# Patient Record
Sex: FEMALE | Race: WHITE | NOT HISPANIC OR LATINO | Employment: UNEMPLOYED | URBAN - METROPOLITAN AREA
[De-identification: names, ages, dates, MRNs, and addresses within clinical notes are randomized per-mention and may not be internally consistent; named-entity substitution may affect disease eponyms.]

---

## 2020-09-02 ENCOUNTER — TELEPHONE (OUTPATIENT)
Dept: OBSTETRICS AND GYNECOLOGY | Facility: CLINIC | Age: 20
End: 2020-09-02

## 2020-09-08 ENCOUNTER — TELEPHONE (OUTPATIENT)
Dept: OBSTETRICS AND GYNECOLOGY | Facility: CLINIC | Age: 20
End: 2020-09-08

## 2021-03-02 ENCOUNTER — OFFICE VISIT (OUTPATIENT)
Dept: ENDOCRINOLOGY | Facility: CLINIC | Age: 21
End: 2021-03-02

## 2021-03-02 ENCOUNTER — LAB (OUTPATIENT)
Dept: LAB | Facility: HOSPITAL | Age: 21
End: 2021-03-02

## 2021-03-02 VITALS
SYSTOLIC BLOOD PRESSURE: 124 MMHG | DIASTOLIC BLOOD PRESSURE: 60 MMHG | BODY MASS INDEX: 34.2 KG/M2 | HEIGHT: 63 IN | WEIGHT: 193 LBS | TEMPERATURE: 97.5 F | OXYGEN SATURATION: 98 %

## 2021-03-02 DIAGNOSIS — E22.1 HYPERPROLACTINEMIA (HCC): ICD-10-CM

## 2021-03-02 DIAGNOSIS — E22.1 HYPERPROLACTINEMIA (HCC): Primary | ICD-10-CM

## 2021-03-02 PROCEDURE — 99204 OFFICE O/P NEW MOD 45 MIN: CPT | Performed by: INTERNAL MEDICINE

## 2021-03-02 PROCEDURE — 84146 ASSAY OF PROLACTIN: CPT

## 2021-03-02 NOTE — ASSESSMENT & PLAN NOTE
A new problem  Prolactin of 26 is only mildly high but there is no obvious reason such and meds or hypothyroidism that would cause this.  She has no clinical features of pcos . So if high prolactin is confirmed then I will plan on mri of pituitary and treat with cabergoline .

## 2021-03-02 NOTE — PROGRESS NOTES
"     Office Note      Date: 2021  Patient Name: Claudia Olmos  MRN: [id number]  : 2000    Cc: high prolactin    History of Present Illness:   Claudia Olmos is a 20 y.o. female who presents for high prolactin.  She is . Menarche age 10 and has never been regular. She has not had lupron for 2 years but only had 4 cycles last year.  She does not have a breast discharge  Labs showed high prolactin- 26 with normal being below 23.  tfts lh, fsh were normal  She has no breast pain and was on no meds at the time     Subjective        Review of Systems:   Review of Systems   Constitutional: Negative.    HENT: Negative.    Eyes: Negative.    Cardiovascular: Negative.    Gastrointestinal: Positive for abdominal pain and constipation.   Endocrine: Negative.    Genitourinary: Positive for menstrual problem.   Musculoskeletal: Negative.    Skin: Negative.    Allergic/Immunologic: Negative.    Neurological: Negative.    Hematological: Negative.    Psychiatric/Behavioral: Negative.        The following portions of the patient's history were reviewed and updated as appropriate: allergies, current medications, past family history, past medical history, past social history, past surgical history and problem list.    Objective     Visit Vitals  /60 (BP Location: Left arm, Patient Position: Sitting, Cuff Size: Adult)   Temp 97.5 °F (36.4 °C) (Infrared)   Ht 160 cm (63\")   Wt 87.5 kg (193 lb)   SpO2 98%   BMI 34.19 kg/m²       Labs:    CBC w/DIFF  No results found for: WBC, RBC, HGB, HCT, MCV, MCH, MCHC, RDW, RDWSD, MPV, PLT, NEUTRORELPCT, LYMPHORELPCT, MONORELPCT, EOSRELPCT, BASORELPCT, AUTOIGPER, NEUTROABS, LYMPHSABS, MONOSABS, EOSABS, BASOSABS, AUTOIGNUM, NRBC    T4  No results found for: FREET4    TSH  No results found for: TSHBASE     Physical Exam:  Physical Exam  Vitals signs reviewed.   Constitutional:       General: She is not in acute distress.     Appearance: She is obese. She is not ill-appearing, " toxic-appearing or diaphoretic.   HENT:      Head: Normocephalic and atraumatic.   Eyes:      Extraocular Movements: Extraocular movements intact.      Pupils: Pupils are equal, round, and reactive to light.   Neck:      Comments: No goiter  Cardiovascular:      Rate and Rhythm: Normal rate and regular rhythm.      Pulses: Normal pulses.      Heart sounds: No murmur.   Pulmonary:      Effort: Pulmonary effort is normal. No respiratory distress.   Musculoskeletal:         General: No swelling.   Lymphadenopathy:      Cervical: No cervical adenopathy.   Skin:     General: Skin is warm and dry.   Neurological:      Mental Status: She is alert and oriented to person, place, and time.   Psychiatric:         Judgment: Judgment normal.         Assessment / Plan      Assessment & Plan:  Problem List Items Addressed This Visit        Other    Hyperprolactinemia (CMS/HCC) - Primary    Current Assessment & Plan      A new problem  Prolactin of 26 is only mildly high but there is no obvious reason such and meds or hypothyroidism that would cause this.  She has no clinical features of pcos . So if high prolactin is confirmed then I will plan on mri of pituitary and treat with cabergoline .           Relevant Orders    Prolactin           Taz Rodriguez MD   03/02/2021

## 2021-03-03 LAB — PROLACTIN SERPL-MCNC: 9.76 NG/ML (ref 4.79–23.3)

## 2021-03-04 ENCOUNTER — TELEPHONE (OUTPATIENT)
Dept: ENDOCRINOLOGY | Facility: CLINIC | Age: 21
End: 2021-03-04

## 2021-03-04 NOTE — TELEPHONE ENCOUNTER
----- Message from Taz Rodriguez MD sent at 3/4/2021  8:40 AM EST -----  Please let her know that her repeat prolactin was normal. Unfortunately, because of that, I have no real answers as to why her cycles are irregular

## 2023-06-09 ENCOUNTER — TRANSCRIBE ORDERS (OUTPATIENT)
Dept: OBSTETRICS AND GYNECOLOGY | Facility: HOSPITAL | Age: 23
End: 2023-06-09
Payer: COMMERCIAL

## 2023-06-09 DIAGNOSIS — E22.1 HYPERPROLACTINEMIA: ICD-10-CM

## 2023-06-09 DIAGNOSIS — O36.1190 ISOIMMUNIZATION FROM BLOOD GROUP INCOMPATIBILITY DURING PREGNANCY, ANTEPARTUM, SINGLE OR UNSPECIFIED FETUS: Primary | ICD-10-CM

## 2023-06-09 DIAGNOSIS — Z34.90 PREGNANCY, UNSPECIFIED GESTATIONAL AGE: ICD-10-CM

## 2023-08-09 ENCOUNTER — HOSPITAL ENCOUNTER (OUTPATIENT)
Dept: WOMENS IMAGING | Facility: HOSPITAL | Age: 23
Discharge: HOME | End: 2023-08-09
Admitting: NURSE PRACTITIONER
Payer: COMMERCIAL

## 2023-08-09 ENCOUNTER — OFFICE VISIT (OUTPATIENT)
Dept: OBSTETRICS AND GYNECOLOGY | Facility: HOSPITAL | Age: 23
End: 2023-08-09
Payer: COMMERCIAL

## 2023-08-09 VITALS — DIASTOLIC BLOOD PRESSURE: 51 MMHG | WEIGHT: 175.2 LBS | SYSTOLIC BLOOD PRESSURE: 107 MMHG | BODY MASS INDEX: 31.04 KG/M2

## 2023-08-09 DIAGNOSIS — Z34.90 PREGNANCY, UNSPECIFIED GESTATIONAL AGE: ICD-10-CM

## 2023-08-09 DIAGNOSIS — O34.219 PREVIOUS CESAREAN DELIVERY AFFECTING PREGNANCY: ICD-10-CM

## 2023-08-09 DIAGNOSIS — E22.1 HYPERPROLACTINEMIA: ICD-10-CM

## 2023-08-09 DIAGNOSIS — O36.1190 ISOIMMUNIZATION FROM BLOOD GROUP INCOMPATIBILITY DURING PREGNANCY, ANTEPARTUM, SINGLE OR UNSPECIFIED FETUS: Primary | ICD-10-CM

## 2023-08-09 DIAGNOSIS — O36.1190 ISOIMMUNIZATION FROM BLOOD GROUP INCOMPATIBILITY DURING PREGNANCY, ANTEPARTUM, SINGLE OR UNSPECIFIED FETUS: ICD-10-CM

## 2023-08-09 PROCEDURE — 76815 OB US LIMITED FETUS(S): CPT

## 2023-08-09 RX ORDER — PRENATAL VIT/IRON FUM/FOLIC AC 27MG-0.8MG
TABLET ORAL DAILY
COMMUNITY

## 2023-08-09 NOTE — ASSESSMENT & PLAN NOTE
Her fetus is being followed given the risk of development of anemia due to maternal isoimmunization.  The middle cerebral artery (MCA) blood velocity is used as an indicator of blood viscosity and Mary et al (Ultrasound Obstet Gynecol 1995;5:400) have developed curves for the estimation of fetal anemia.  These curves are gestational age-dependent and allow assessment from 18-35 weeks' gestation.  The MCA velocity technique allows us to monitor the progress of a pregnancy complicated by alloimmunization without resorting to amniocentesis as frequently as prior to the development of this method.  They are used as an adjunct to regular ultrasound assessment, antibody titer measurement, NST/SORAYA (if appropriate) and amniocentesis.  Based on these curves, an MCA systolic peak velocity of > 1.5 MoM for gestational age carries the highest risk for significant fetal anemia.  If the peak MCA systolic velocity is above 1.5 MoM for gestational age, intervention is generally suggested (amniocentesis or fetal blood sampling) to more directly assess fetal anemia.      On ultrasound today the fetus appears entirely normal.  No abnormalities were seen and no evidence of hydrops, however it was too early to perform a complete anatomic survey.  We will rescan the patient again in 4 weeks to complete an anatomic survey.    Isoimmunization with antibody titers less than 8 are generally not associated with in utero fetal compromise.  As such we would recommend monthly antibody titers on this patient and as long as the titer stays less than 8 there is not a need for her to return to the  diagnostic center.  If her titer increases to 8 or greater then we would recommend referring her back to the  diagnostic center for further evaluation.

## 2023-08-09 NOTE — LETTER
August 9, 2023     SARIKA Warren  [address] [place] [id number]    Patient: Claudia Olmos   YOB: 2000   Date of Visit: 8/9/2023       Dear SARIKA Warren:    Thank you for referring Claudia Olmos to me for evaluation. Below are the relevant portions of my assessment and plan of care.    Encounter Diagnosis and Orders:  Diagnoses and all orders for this visit:    1. 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  The middle cerebral artery (MCA) blood velocity is used as an indicator of blood viscosity and Mary et al (Ultrasound Obstet Gynecol 1995;5:400) have developed curves for the estimation of fetal anemia.  These curves are gestational age-dependent and allow assessment from 18-35 weeks' gestation.  The MCA velocity technique allows us to monitor the progress of a pregnancy complicated by alloimmunization without resorting to amniocentesis as frequently as prior to the development of this method.  They are used as an adjunct to regular ultrasound assessment, antibody titer measurement, NST/SORAYA (if appropriate) and amniocentesis.  Based on these curves, an MCA systolic peak velocity of > 1.5 MoM for gestational age carries the highest risk for significant fetal anemia.  If the peak MCA systolic velocity is above 1.5 MoM for gestational age, intervention is generally suggested (amniocentesis or fetal blood sampling) to more directly assess fetal anemia.      On ultrasound today the fetus appears entirely normal.  No abnormalities were seen and no evidence of hydrops, however it was too early to perform a complete anatomic survey.  We will rescan the patient again in 4 weeks to complete an anatomic survey.    Isoimmunization with antibody titers less than 8 are generally not associated with in utero  fetal compromise.  As such we would recommend monthly antibody titers on this patient and as long as the titer stays less than 8 there is not a need for her to return to the  diagnostic center.  If her titer increases to 8 or greater then we would recommend referring her back to the  diagnostic center for further evaluation.      2. Previous  delivery affecting pregnancy    3. Pregnancy, unspecified gestational age        If you have questions, please do not hesitate to call me. I look forward to following Claudia along with you.         Sincerely,        Douglas A. Milligan, MD        CC: No Recipients

## 2023-08-09 NOTE — PROGRESS NOTES
"Documentation of the ultrasound findings, images, and interpretations will be available in the patient's Viewpoint report which is located in the imaging tab in chart review.    Maternal/Fetal Medicine Consult Note     Name: Claudia Olmos    : 2000     MRN: [id number]     Referring Provider: SARIKA Warren    Chief Complaint  No chief complaint on file.    Subjective     History of Present Illness:  Claudia Olmos is a 22 y.o.  16w0d who presents today for isoimmunization    VANDANA: Estimated Date of Delivery: 24     ROS:   As noted in HPI.     Past Medical History:   Diagnosis Date    History of transfusion 2018    had one during c section      Past Surgical History:   Procedure Laterality Date     SECTION  2018    TONSILLECTOMY AND ADENOIDECTOMY        OB History          2    Para   1    Term   1       0    AB   0    Living   1         SAB   0    IAB   0    Ectopic   0    Molar   0    Multiple   0    Live Births   1          Obstetric Comments   Fob #1 - Pregnancy #1  Fob #2 - Pregnancy #2                Objective     Vital Signs  /51   Wt 79.5 kg (175 lb 3.2 oz)   Estimated body mass index is 31.04 kg/mý as calculated from the following:    Height as of 3/2/21: 160 cm (63\").    Weight as of this encounter: 79.5 kg (175 lb 3.2 oz).    Physical Exam    Ultrasound Impression:   See viewpoint    Assessment and Plan     Claudia Olmos is a 22 y.o.  16w0d who presents today for isoimmunization    Diagnoses and all orders for this visit:    1. 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  The middle cerebral artery (MCA) blood velocity is used as an indicator of blood viscosity and Mary et al (Ultrasound Obstet Gynecol 1995;5:400) have developed curves for the estimation of " fetal anemia.  These curves are gestational age-dependent and allow assessment from 18-35 weeks' gestation.  The MCA velocity technique allows us to monitor the progress of a pregnancy complicated by alloimmunization without resorting to amniocentesis as frequently as prior to the development of this method.  They are used as an adjunct to regular ultrasound assessment, antibody titer measurement, NST/SORAYA (if appropriate) and amniocentesis.  Based on these curves, an MCA systolic peak velocity of > 1.5 MoM for gestational age carries the highest risk for significant fetal anemia.  If the peak MCA systolic velocity is above 1.5 MoM for gestational age, intervention is generally suggested (amniocentesis or fetal blood sampling) to more directly assess fetal anemia.      On ultrasound today the fetus appears entirely normal.  No abnormalities were seen and no evidence of hydrops, however it was too early to perform a complete anatomic survey.  We will rescan the patient again in 4 weeks to complete an anatomic survey.    Isoimmunization with antibody titers less than 8 are generally not associated with in utero fetal compromise.  As such we would recommend monthly antibody titers on this patient and as long as the titer stays less than 8 there is not a need for her to return to the  diagnostic center.  If her titer increases to 8 or greater then we would recommend referring her back to the  diagnostic center for further evaluation.      2. Previous  delivery affecting pregnancy    3. Pregnancy, unspecified gestational age         Follow Up  Return in about 4 weeks (around 2023).    I spent 30 minutes caring for the patient on the day of service. This included: obtaining or reviewing a separately obtained medical history, reviewing patient records, performing a medically appropriate exam and/or evaluation, counseling or educating the patient/family/caregiver, ordering medications, labs,  and/or procedures and documenting such in the medical record. This does not include time spent on review and interpretation of other tests such as fetal ultrasound or the performance of other procedures such as amniocentesis or CVS.      Douglas A. Milligan, MD  Maternal Fetal Medicine, [facility]     2023

## 2023-09-07 ENCOUNTER — OFFICE VISIT (OUTPATIENT)
Dept: OBSTETRICS AND GYNECOLOGY | Facility: HOSPITAL | Age: 23
End: 2023-09-07
Payer: COMMERCIAL

## 2023-09-07 ENCOUNTER — HOSPITAL ENCOUNTER (OUTPATIENT)
Dept: WOMENS IMAGING | Facility: HOSPITAL | Age: 23
Discharge: HOME | End: 2023-09-07
Admitting: OBSTETRICS & GYNECOLOGY
Payer: COMMERCIAL

## 2023-09-07 VITALS — SYSTOLIC BLOOD PRESSURE: 111 MMHG | WEIGHT: 178 LBS | BODY MASS INDEX: 31.53 KG/M2 | DIASTOLIC BLOOD PRESSURE: 66 MMHG

## 2023-09-07 DIAGNOSIS — O36.1190 ISOIMMUNIZATION FROM BLOOD GROUP INCOMPATIBILITY DURING PREGNANCY, ANTEPARTUM, SINGLE OR UNSPECIFIED FETUS: Primary | ICD-10-CM

## 2023-09-07 DIAGNOSIS — O34.219 PREVIOUS CESAREAN DELIVERY AFFECTING PREGNANCY: ICD-10-CM

## 2023-09-07 DIAGNOSIS — O36.1190 ISOIMMUNIZATION FROM BLOOD GROUP INCOMPATIBILITY DURING PREGNANCY, ANTEPARTUM, SINGLE OR UNSPECIFIED FETUS: ICD-10-CM

## 2023-09-07 DIAGNOSIS — Z34.90 PREGNANCY, UNSPECIFIED GESTATIONAL AGE: ICD-10-CM

## 2023-09-07 PROCEDURE — 76811 OB US DETAILED SNGL FETUS: CPT

## 2023-09-07 NOTE — LETTER
"2023       No Recipients    Patient: Claudia Olmos   YOB: 2000   Date of Visit: 2023       Dear SARIKA Warren,    Thank you for referring Claudia Olmos to me for evaluation. Below is a copy of my consult note.    If you have questions, please do not hesitate to call me. I look forward to following Claudia along with you.         Sincerely,        Kimberley Zapata MD        CC:   No Recipients    Patient denies bleeding, leaking fluid or contractions  NIPT negative  Patients next follow up with ISAI BAUM is 2023        Maternal/Fetal Medicine Follow Up Note     Name: Claudia Olmos    : 2000     MRN: [id number]     Referring Provider: SARIKA Warren    Chief Complaint  Anti C positive    Subjective     History of Present Illness:  Claudia Olmos is a 22 y.o.  20w1d who presents today for follow up in the setting of maternal isoimmunization   No interval issues   Last titer was 1:1 (Anti-c, 23)     VANDANA: Estimated Date of Delivery: 24     ROS:   As noted in HPI.     Objective     Vital Signs  /66   Wt 80.7 kg (178 lb)   Estimated body mass index is 31.53 kg/m² as calculated from the following:    Height as of 3/2/21: 160 cm (63\").    Weight as of this encounter: 80.7 kg (178 lb).    Ultrasound Impression:   See viewpoint    Assessment and Plan     Claudia Olmos is a 22 y.o.  20w1d who presents today for ***    Diagnoses and all orders for this visit:    1. 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 Recommend continued monthly titers in your office. If titer increases to 1:16, please re-refer [facility] for serial MCA dopplers. Otherwise follow up with MFM as needed.              Follow Up  PRN     I spent 20 minutes caring for the patient on the day of service. This included: obtaining or reviewing a " separately obtained medical history, reviewing patient records, performing a medically appropriate exam and/or evaluation, counseling or educating the patient/family/caregiver, ordering medications, labs, and/or procedures and documenting such in the medical record. This does not include time spent on review and interpretation of other tests such as fetal ultrasound or the performance of other procedures such as amniocentesis or CVS.    Kimberley Zapata MD FACOG  Maternal Fetal Medicine, [facility]     2023

## 2023-09-07 NOTE — PROGRESS NOTES
Patient denies bleeding, leaking fluid or contractions  NIPT negative  Patients next follow up with ISAI BAUM is 9/11/2023

## 2023-09-07 NOTE — ASSESSMENT & PLAN NOTE
Last antibody titer was 1:1. Recommend continued monthly titers in your office. If titer increases to 1:16, please [facility] serial MCA dopplers. Otherwise follow up with MFM as needed.

## 2023-09-07 NOTE — PROGRESS NOTES
"    Maternal/Fetal Medicine Follow Up Note     Name: Claudia Olmos    : 2000     MRN: [id number]     Referring Provider: SARIKA Warren    Chief Complaint  Anti C positive    Subjective     History of Present Illness:  Claudia Olmos is a 22 y.o.  20w1d who presents today for follow up in the setting of maternal isoimmunization   No interval issues   Last titer was 1:1 (Anti-c, 23)     VANDANA: Estimated Date of Delivery: 24     ROS:   As noted in HPI.     Objective     Vital Signs  /66   Wt 80.7 kg (178 lb)   Estimated body mass index is 31.53 kg/m² as calculated from the following:    Height as of 3/2/21: 160 cm (63\").    Weight as of this encounter: 80.7 kg (178 lb).    Ultrasound Impression:   See viewpoint    Assessment and Plan     Claudia Olmos is a 22 y.o.  20w1d who presents today for follow up in the setting of isoimmunization with low titer     Diagnoses and all orders for this visit:    1. 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 Recommend continued monthly titers in your office. If titer increases to 1:16, please re-refer patient to [facility] for serial MCA dopplers. Otherwise follow up with MFM as needed.              Follow Up  PRN     I spent 20 minutes caring for the patient on the day of service. This included: obtaining or reviewing a separately obtained medical history, reviewing patient records, performing a medically appropriate exam and/or evaluation, counseling or educating the patient/family/caregiver, ordering medications, labs, and/or procedures and documenting such in the medical record. This does not include time spent on review and interpretation of other tests such as fetal ultrasound or the performance of other procedures such as amniocentesis or CVS.    Kimberley Zapata MD FACOG  Maternal Fetal Medicine, [facility] " [place]    [facility]     2023

## 2023-09-07 NOTE — LETTER
"2023       No Recipients    Patient: Claudia Olmos   YOB: 2000   Date of Visit: 2023       Dear SARIKA Warren,    Thank you for referring Claudia Olmos to me for evaluation. Below is a copy of my consult note.    If you have questions, please do not hesitate to call me. I look forward to following Claudia along with you.         Sincerely,        Kimberley Zapata MD        CC:   No Recipients    Patient denies bleeding, leaking fluid or contractions  NIPT negative  Patients next follow up with ISAI BAUM is 2023        Maternal/Fetal Medicine Follow Up Note     Name: Claudia Olmos    : 2000     MRN: [id number]     Referring Provider: SARIKA Warren    Chief Complaint  Anti C positive    Subjective     History of Present Illness:  Claudia Olmos is a 22 y.o.  20w1d who presents today for follow up in the setting of maternal isoimmunization   No interval issues   Last titer was 1:1 (Anti-c, 23)     VANDANA: Estimated Date of Delivery: 24     ROS:   As noted in HPI.     Objective     Vital Signs  /66   Wt 80.7 kg (178 lb)   Estimated body mass index is 31.53 kg/m² as calculated from the following:    Height as of 3/2/21: 160 cm (63\").    Weight as of this encounter: 80.7 kg (178 lb).    Ultrasound Impression:   See viewpoint    Assessment and Plan     Claudia Olmos is a 22 y.o.  20w1d who presents today for follow up in the setting of isoimmunization with low titer     Diagnoses and all orders for this visit:    1. 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 Recommend continued monthly titers in your office. If titer increases to 1:16, please [facility] serial MCA dopplers. Otherwise follow up with MFM as needed.              Follow Up  PRN     I spent 20 minutes caring for the patient on the " day of service. This included: obtaining or reviewing a separately obtained medical history, reviewing patient records, performing a medically appropriate exam and/or evaluation, counseling or educating the patient/family/caregiver, ordering medications, labs, and/or procedures and documenting such in the medical record. This does not include time spent on review and interpretation of other tests such as fetal ultrasound or the performance of other procedures such as amniocentesis or CVS.    Kimberley Zapata MD FACOG  Maternal Fetal Medicine, [facility]     2023

## 2024-09-24 ENCOUNTER — TRANSCRIBE ORDERS (OUTPATIENT)
Dept: OBSTETRICS AND GYNECOLOGY | Facility: HOSPITAL | Age: 24
End: 2024-09-24
Payer: COMMERCIAL

## 2024-09-24 DIAGNOSIS — Z98.891 HISTORY OF 2 CESAREAN SECTIONS: ICD-10-CM

## 2024-09-24 DIAGNOSIS — O30.031 MONOCHORIONIC DIAMNIOTIC TWIN PREGNANCY IN FIRST TRIMESTER: ICD-10-CM

## 2024-09-24 DIAGNOSIS — Z34.90 PREGNANCY, UNSPECIFIED GESTATIONAL AGE: ICD-10-CM

## 2024-09-24 DIAGNOSIS — O36.1190 ISOIMMUNIZATION FROM BLOOD GROUP INCOMPATIBILITY DURING PREGNANCY, ANTEPARTUM, SINGLE OR UNSPECIFIED FETUS: Primary | ICD-10-CM

## 2024-10-22 ENCOUNTER — HOSPITAL ENCOUNTER (OUTPATIENT)
Dept: WOMENS IMAGING | Facility: HOSPITAL | Age: 24
Discharge: HOME | End: 2024-10-22
Payer: COMMERCIAL

## 2024-10-22 ENCOUNTER — OFFICE VISIT (OUTPATIENT)
Dept: OBSTETRICS AND GYNECOLOGY | Facility: HOSPITAL | Age: 24
End: 2024-10-22
Payer: COMMERCIAL

## 2024-10-22 VITALS
DIASTOLIC BLOOD PRESSURE: 79 MMHG | WEIGHT: 177 LBS | BODY MASS INDEX: 32.57 KG/M2 | SYSTOLIC BLOOD PRESSURE: 119 MMHG | HEIGHT: 62 IN

## 2024-10-22 DIAGNOSIS — O30.039 MONOCHORIONIC DIAMNIOTIC TWIN PREGNANCY, ANTEPARTUM: Primary | ICD-10-CM

## 2024-10-22 DIAGNOSIS — O36.1190 ISOIMMUNIZATION FROM BLOOD GROUP INCOMPATIBILITY DURING PREGNANCY, ANTEPARTUM, SINGLE OR UNSPECIFIED FETUS: ICD-10-CM

## 2024-10-22 DIAGNOSIS — Z34.90 PREGNANCY, UNSPECIFIED GESTATIONAL AGE: ICD-10-CM

## 2024-10-22 DIAGNOSIS — O30.031 MONOCHORIONIC DIAMNIOTIC TWIN PREGNANCY IN FIRST TRIMESTER: ICD-10-CM

## 2024-10-22 DIAGNOSIS — O34.219 PREVIOUS CESAREAN DELIVERY AFFECTING PREGNANCY: ICD-10-CM

## 2024-10-22 DIAGNOSIS — Z98.891 HISTORY OF 2 CESAREAN SECTIONS: ICD-10-CM

## 2024-10-22 PROCEDURE — 76813 OB US NUCHAL MEAS 1 GEST: CPT

## 2024-10-22 PROCEDURE — 76814 OB US NUCHAL MEAS ADD-ON: CPT

## 2024-10-22 PROCEDURE — 76801 OB US < 14 WKS SINGLE FETUS: CPT

## 2024-10-22 PROCEDURE — 76802 OB US < 14 WKS ADDL FETUS: CPT

## 2024-10-22 RX ORDER — PROMETHAZINE HYDROCHLORIDE 12.5 MG/1
12.5 TABLET ORAL EVERY 6 HOURS PRN
COMMUNITY
Start: 2024-09-18

## 2024-10-22 NOTE — ASSESSMENT & PLAN NOTE
Patient referred for isoimmunization in this pregnancy.  Patient's last pregnancy was complicated by anti-C antibodies.  These antibodies never silvina to a sufficient titer to require surveillance or intervention during her last pregnancy.  She has anti-C antibodies again but her titer is already 1-1 28..  This is high enough that it may resulting in fetal anemia.    Father this pregnancy was provided with a prescription for C antigen testing.  Patient will take this to the lab and if they will not perform it based on a prescription he will contact his primary care provider for testing.  We will assume unless we see evidence that he is to see antigen negative at that pregnancies are at risk and perform every 2-week middle cerebral artery Doppler peak velocities.  We begin these at 18 weeks gestation as it would be not possible to eat performing any in utero intervention prior to 18 weeks gestation.    We will scan the patient every 2 weeks after 18 weeks gestation to the performed MCA Doppler peak velocities.  If these remain reassuring the patient should deliver at 36 to 37 weeks gestation.

## 2024-10-22 NOTE — PROGRESS NOTES
"Documentation of the ultrasound findings, images, and interpretations will be available in the patient's Viewpoint report which is located in the imaging tab in chart review.    Maternal/Fetal Medicine Consult Note     Name: Claudia Olmos    : 2000     MRN: [id number]     Referring Provider: SARIKA Recinos    Chief Complaint  Mo/Di twins, + anti c antibodies, prev C/S x 2    Subjective     History of Present Illness:  Claudia Olmos is a 24 y.o.  12w2d who presents today for Edmundo twins, isoimmunization    VANDANA: Estimated Date of Delivery: 25     ROS:   As noted in HPI.     Past Medical History:   Diagnosis Date    Chlamydia     History of transfusion     had one during c section      Past Surgical History:   Procedure Laterality Date     SECTION       SECTION      TONSILLECTOMY AND ADENOIDECTOMY        OB History          3    Para   2    Term   2       0    AB   0    Living   2         SAB   0    IAB   0    Ectopic   0    Molar   0    Multiple   0    Live Births   2          Obstetric Comments   Fob #1 - Pregnancy #1  Fob #2 - Pregnancy #2   FOB #2 Pregnancy #3               Objective     Vital Signs  /79   Ht 157.5 cm (62\")   Wt 80.3 kg (177 lb)   LMP  (LMP Unknown)   Estimated body mass index is 32.37 kg/m² as calculated from the following:    Height as of this encounter: 157.5 cm (62\").    Weight as of this encounter: 80.3 kg (177 lb).    Physical Exam    Ultrasound Impression:   See Viewpoint    Assessment and Plan     Claudia Olmos is a 24 y.o.  12w2d who presents today for AMA, isoimmunization    Diagnoses and all orders for this visit:    1. Monochorionic diamniotic twin pregnancy, antepartum (Primary)  Assessment & Plan:  This twin gestation appears monochorionic/diamniotic with same sex fetuses and a thin dividing membrane with absence of a twin-peak sign. The gestational age of the pregnancy by ultrasound is compatible with the " previously assigned gestational age. Growth in both fetuses is currently appropriate and proportional. There is no obvious evidence of any fetal anomalies in either twin (please see detailed anatomy reports). There is a membrane seen  the two fetuses. There are equal amounts of amniotic fluid on either side of the membrane. I have counseled the patient that in some cases of single appearing placentation there are actually two placentas that are opposed and appear as a single placenta.     The patient was counseled on the nature of monochorionic and dichorionic pregnancies. Risks unique to monochorionic/diamniotic twins include: Selective fetal growth restriction (10-15%), twin-twin transfusion syndrome (10-15%), twin-anemia polycythemia sequence (3-6%) and twin reversed atrial perfusion sequence in 1 to 2.6%. We discussed the risks and management of twin-twin transfusion syndrome (TTTS) and I reassured the patient that at this time there does not appear to be evidence of TTTS.     The increased risks of a monochorionic twin gestation were discussed and the patient was warned about the symptoms and signs of TTTS and complications including hydramnios, preeclampsia,  labor,  premature rupture of membranes, anemia, urinary tract infection and gestational diabetes. If a profound increase in uterine size is perceived by the patient, she is to present to the office or the labor and delivery unit for the evaluation of acute TTTS. The timing of delivery in an uncomplicated gestation is recommended between 34-37 weeks gestation but sooner for evidence of maternal or fetal compromise.     I spent a total of minutes with the patient of which greater than 50% was face-to-face counseling and coordination of care. Questions asked by the patient were answered.    Ultrasound today demonstrates monochorionic diamniotic twins.  No abnormalities were seen but was too early for anatomic survey.  Nasal bones were  present on both twins.  Nuchal translucency's were present on both twins.  Cervical length appeared normal    Patient plans on having cell free DNA screen drawn at her local provider.  We will rescan the patient again in 6 weeks.  We would recommend scans every 2 weeks at the patient's local provider office until the patient returns to see us to look for fluid discrepancies consistent with twin-twin transfusion syndrome.    We will rescan the patient again at 18 weeks gestation to perform an anatomic survey, to check for signs of twin-twin transfusion syndrome and to perform middle cerebral artery Doppler peak velocities for isoimmunization.  As we will be seeing the patient every 2 weeks for isoimmunization we can perform the twin-twin transfusion checks each time at our office.    Orders:  -     [facility] [place]; Future    2. 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  Patient's last pregnancy was complicated by anti-C antibodies.  These antibodies never silvina to a sufficient titer to require surveillance or intervention during her last pregnancy.  She has anti-C antibodies again but her titer is already 1-1 28..  This is high enough that it may resulting in fetal anemia.    Father this pregnancy was provided with a prescription for C antigen testing.  Patient will take this to the lab and if they will not perform it based on a prescription he will contact his primary care provider for testing.  We will assume unless we see evidence that he is to see antigen negative at that pregnancies are at risk and perform every 2-week middle cerebral artery Doppler peak velocities.  We begin these at 18 weeks gestation as it would be not possible to eat performing any in utero intervention prior to 18 weeks gestation.    We will scan the patient every 2 weeks after 18  weeks gestation to the performed MCA Doppler peak velocities.  If these remain reassuring the patient should deliver at 36 to 37 weeks gestation.    Orders:  -     [facility]; Future    3. Previous  delivery affecting pregnancy  -     [facility]; Future         Follow Up  Return in about 6 weeks (around 12/3/2024).    I spent 40 minutes caring for the patient on the day of service. This included: obtaining or reviewing a separately obtained medical history, reviewing patient records, performing a medically appropriate exam and/or evaluation, counseling or educating the patient/family/caregiver, ordering medications, labs, and/or procedures and documenting such in the medical record. This does not include time spent on review and interpretation of other tests such as fetal ultrasound or the performance of other procedures such as amniocentesis or CVS.      Douglas A. Milligan, MD  Maternal Fetal Medicine, [facility]    [facility]     10/22/2024

## 2024-10-22 NOTE — LETTER
"2024     SARIKA Recinos  [address] [address]    Patient: Claudia Olmos   YOB: 2000   Date of Visit: 10/22/2024     Dear SARIKA Recinos:       Thank you for referring Claudia Olmos to me for evaluation. Below are the relevant portions of my assessment and plan of care.    If you have questions, please do not hesitate to call me. I look forward to following Claudia along with you.         Sincerely,        Douglas A. Milligan, MD        CC: No Recipients    Milligan, Douglas A, MD  10/22/24 1400  Sign when Signing Visit  Documentation of the ultrasound findings, images, and interpretations will be available in the patient's Viewpoint report which is located in the imaging tab in chart review.    Maternal/Fetal Medicine Consult Note     Name: Claudia Olmos    : 2000     MRN: [id number]     Referring Provider: SARIKA Recinos    Chief Complaint  Mo/Di twins, + anti c antibodies, prev C/S x 2    Subjective     History of Present Illness:  Claudia Olmos is a 24 y.o.  12w2d who presents today for Edmundo twins, isoimmunization    VANDANA: Estimated Date of Delivery: 25     ROS:   As noted in HPI.     Past Medical History:   Diagnosis Date   • Chlamydia    • History of transfusion 2018    had one during c section      Past Surgical History:   Procedure Laterality Date   •  SECTION     •  SECTION     • TONSILLECTOMY AND ADENOIDECTOMY        OB History          3    Para   2    Term   2       0    AB   0    Living   2         SAB   0    IAB   0    Ectopic   0    Molar   0    Multiple   0    Live Births   2          Obstetric Comments   Fob #1 - Pregnancy #1  Fob #2 - Pregnancy #2   FOB #2 Pregnancy #3               Objective     Vital Signs  /79   Ht 157.5 cm (62\")   Wt 80.3 kg (177 lb)   LMP  (LMP Unknown)   Estimated body mass index is 32.37 kg/m² as calculated from the following:    Height as of this encounter: 157.5 cm " "(62\").    Weight as of this encounter: 80.3 kg (177 lb).    Physical Exam    Ultrasound Impression:   See Viewpoint    Assessment and Plan     Claudia Olmos is a 24 y.o.  12w2d who presents today for AMA, isoimmunization    Diagnoses and all orders for this visit:    1. Monochorionic diamniotic twin pregnancy, antepartum (Primary)  Assessment & Plan:  This twin gestation appears monochorionic/diamniotic with same sex fetuses and a thin dividing membrane with absence of a twin-peak sign. The gestational age of the pregnancy by ultrasound is compatible with the previously assigned gestational age. Growth in both fetuses is currently appropriate and proportional. There is no obvious evidence of any fetal anomalies in either twin (please see detailed anatomy reports). There is a membrane seen  the two fetuses. There are equal amounts of amniotic fluid on either side of the membrane. I have counseled the patient that in some cases of single appearing placentation there are actually two placentas that are opposed and appear as a single placenta.     The patient was counseled on the nature of monochorionic and dichorionic pregnancies. Risks unique to monochorionic/diamniotic twins include: Selective fetal growth restriction (10-15%), twin-twin transfusion syndrome (10-15%), twin-anemia polycythemia sequence (3-6%) and twin reversed atrial perfusion sequence in 1 to 2.6%. We discussed the risks and management of twin-twin transfusion syndrome (TTTS) and I reassured the patient that at this time there does not appear to be evidence of TTTS.     The increased risks of a monochorionic twin gestation were discussed and the patient was warned about the symptoms and signs of TTTS and complications including hydramnios, preeclampsia,  labor,  premature rupture of membranes, anemia, urinary tract infection and gestational diabetes. If a profound increase in uterine size is perceived by the patient, she " is to present to the office or the labor and delivery unit for the evaluation of acute TTTS. The timing of delivery in an uncomplicated gestation is recommended between 34-37 weeks gestation but sooner for evidence of maternal or fetal compromise.     I spent a total of minutes with the patient of which greater than 50% was face-to-face counseling and coordination of care. Questions asked by the patient were answered.    Ultrasound today demonstrates monochorionic diamniotic twins.  No abnormalities were seen but was too early for anatomic survey.  Nasal bones were present on both twins.  Nuchal translucency's were present on both twins.  Cervical length appeared normal    Patient plans on having cell free DNA screen drawn at her local provider.  We will rescan the patient again in 6 weeks.  We would recommend scans every 2 weeks at the patient's local provider office until the patient returns to see us to look for fluid discrepancies consistent with twin-twin transfusion syndrome.    We will rescan the patient again at 18 weeks gestation to perform an anatomic survey, to check for signs of twin-twin transfusion syndrome and to perform middle cerebral artery Doppler peak velocities for isoimmunization.  As we will be seeing the patient every 2 weeks for isoimmunization we can perform the twin-twin transfusion checks each time at our office.    Orders:  -     [facility]; Future    2. 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  Patient's last pregnancy was complicated by anti-C antibodies.  These antibodies never silvina to a sufficient titer to require surveillance or intervention during her last pregnancy.  She has anti-C antibodies again but her titer is already 1-1 28..  This is high enough that it may resulting in fetal anemia.    Father this pregnancy  was provided with a prescription for C antigen testing.  Patient will take this to the lab and if they will not perform it based on a prescription he will contact his primary care provider for testing.  We will assume unless we see evidence that he is to see antigen negative at that pregnancies are at risk and perform every 2-week middle cerebral artery Doppler peak velocities.  We begin these at 18 weeks gestation as it would be not possible to eat performing any in utero intervention prior to 18 weeks gestation.    We will scan the patient every 2 weeks after 18 weeks gestation to the performed MCA Doppler peak velocities.  If these remain reassuring the patient should deliver at 36 to 37 weeks gestation.    Orders:  -     [facility]; Future    3. Previous  delivery affecting pregnancy  -     [facility]; Future         Follow Up  Return in about 6 weeks (around 12/3/2024).    I spent 40 minutes caring for the patient on the day of service. This included: obtaining or reviewing a separately obtained medical history, reviewing patient records, performing a medically appropriate exam and/or evaluation, counseling or educating the patient/family/caregiver, ordering medications, labs, and/or procedures and documenting such in the medical record. This does not include time spent on review and interpretation of other tests such as fetal ultrasound or the performance of other procedures such as amniocentesis or CVS.      Douglas A. Milligan, MD  Maternal Fetal Medicine, [facility]    Diagn[facility] Center     10/22/2024

## 2024-10-22 NOTE — PROGRESS NOTES
Patient denies bleeding, leaking fluid or cramping  NIPT not done yet  Patients next follow up with [facility] office is 10/23/24

## 2024-10-22 NOTE — ASSESSMENT & PLAN NOTE
This twin gestation appears monochorionic/diamniotic with same sex fetuses and a thin dividing membrane with absence of a twin-peak sign. The gestational age of the pregnancy by ultrasound is compatible with the previously assigned gestational age. Growth in both fetuses is currently appropriate and proportional. There is no obvious evidence of any fetal anomalies in either twin (please see detailed anatomy reports). There is a membrane seen  the two fetuses. There are equal amounts of amniotic fluid on either side of the membrane. I have counseled the patient that in some cases of single appearing placentation there are actually two placentas that are opposed and appear as a single placenta.     The patient was counseled on the nature of monochorionic and dichorionic pregnancies. Risks unique to monochorionic/diamniotic twins include: Selective fetal growth restriction (10-15%), twin-twin transfusion syndrome (10-15%), twin-anemia polycythemia sequence (3-6%) and twin reversed atrial perfusion sequence in 1 to 2.6%. We discussed the risks and management of twin-twin transfusion syndrome (TTTS) and I reassured the patient that at this time there does not appear to be evidence of TTTS.     The increased risks of a monochorionic twin gestation were discussed and the patient was warned about the symptoms and signs of TTTS and complications including hydramnios, preeclampsia,  labor,  premature rupture of membranes, anemia, urinary tract infection and gestational diabetes. If a profound increase in uterine size is perceived by the patient, she is to present to the office or the labor and delivery unit for the evaluation of acute TTTS. The timing of delivery in an uncomplicated gestation is recommended between 34-37 weeks gestation but sooner for evidence of maternal or fetal compromise.     I spent a total of minutes with the patient of which greater than 50% was face-to-face counseling and  coordination of care. Questions asked by the patient were answered.    Ultrasound today demonstrates monochorionic diamniotic twins.  No abnormalities were seen but was too early for anatomic survey.  Nasal bones were present on both twins.  Nuchal translucency's were present on both twins.  Cervical length appeared normal    Patient plans on having cell free DNA screen drawn at her local provider.  We will rescan the patient again in 6 weeks.  We would recommend scans every 2 weeks at the patient's local provider office until the patient returns to see us to look for fluid discrepancies consistent with twin-twin transfusion syndrome.    We will rescan the patient again at 18 weeks gestation to perform an anatomic survey, to check for signs of twin-twin transfusion syndrome and to perform middle cerebral artery Doppler peak velocities for isoimmunization.  As we will be seeing the patient every 2 weeks for isoimmunization we can perform the twin-twin transfusion checks each time at our office.

## 2024-12-03 ENCOUNTER — HOSPITAL ENCOUNTER (OUTPATIENT)
Dept: WOMENS IMAGING | Facility: HOSPITAL | Age: 24
Discharge: HOME | End: 2024-12-03
Admitting: OBSTETRICS & GYNECOLOGY
Payer: COMMERCIAL

## 2024-12-03 ENCOUNTER — OFFICE VISIT (OUTPATIENT)
Dept: OBSTETRICS AND GYNECOLOGY | Facility: HOSPITAL | Age: 24
End: 2024-12-03
Payer: COMMERCIAL

## 2024-12-03 VITALS — BODY MASS INDEX: 33.47 KG/M2 | DIASTOLIC BLOOD PRESSURE: 72 MMHG | SYSTOLIC BLOOD PRESSURE: 133 MMHG | WEIGHT: 183 LBS

## 2024-12-03 DIAGNOSIS — Z3A.18 18 WEEKS GESTATION OF PREGNANCY: ICD-10-CM

## 2024-12-03 DIAGNOSIS — O36.1190 ISOIMMUNIZATION FROM BLOOD GROUP INCOMPATIBILITY DURING PREGNANCY, ANTEPARTUM, SINGLE OR UNSPECIFIED FETUS: ICD-10-CM

## 2024-12-03 DIAGNOSIS — O30.039 MONOCHORIONIC DIAMNIOTIC TWIN PREGNANCY, ANTEPARTUM: Primary | ICD-10-CM

## 2024-12-03 DIAGNOSIS — O30.039 MONOCHORIONIC DIAMNIOTIC TWIN PREGNANCY, ANTEPARTUM: ICD-10-CM

## 2024-12-03 DIAGNOSIS — O34.219 PREVIOUS CESAREAN DELIVERY AFFECTING PREGNANCY: ICD-10-CM

## 2024-12-03 PROCEDURE — 76812 OB US DETAILED ADDL FETUS: CPT

## 2024-12-03 PROCEDURE — 76811 OB US DETAILED SNGL FETUS: CPT

## 2024-12-03 PROCEDURE — 76820 UMBILICAL ARTERY ECHO: CPT

## 2024-12-03 PROCEDURE — 76821 MIDDLE CEREBRAL ARTERY ECHO: CPT

## 2024-12-03 NOTE — LETTER
"December 3, 2024     SARIKA Recinos  [address] [facility] [address] [facility] [address]    Patient: Claudia Olmos   YOB: 2000   Date of Visit: 12/3/2024     Dear SARIKA Recinos:       Thank you for referring Claudia Olmos to me for evaluation. Below are the relevant portions of my assessment and plan of care.    If you have questions, please do not hesitate to call me. I look forward to following Claudia along with you.         Sincerely,        Douglas A. Milligan, MD        CC: No Recipients    Milligan, Douglas A, MD  24 1330  Sign when Signing Visit  Documentation of the ultrasound findings, images, and interpretations will be available in the patient's Viewpoint report which is located in the imaging tab in chart review.    Maternal/Fetal Medicine Follow Up  Note     Name: Claudia Olmos    : 2000     MRN: [id number]     Referring Provider: SARIKA Recinos    Chief Complaint  Mo/di twins; + Anti C Antibodies; Prev c/s x2    Subjective     History of Present Illness:  Claudia Olmos is a 24 y.o.  18w2d who presents today for Twins, isoimmunization    VANDANA: Estimated Date of Delivery: 25     ROS:   As noted in HPI.     Objective     Vital Signs  /72   Wt 83 kg (183 lb)   LMP  (LMP Unknown)   Estimated body mass index is 33.47 kg/m² as calculated from the following:    Height as of 10/22/24: 157.5 cm (62\").    Weight as of this encounter: 83 kg (183 lb).    Physical Exam    Ultrasound Impression:   See VIewpoint    Assessment and Plan     Claudia Olmos is a 24 y.o.  18w2d who presents today for twins, isoimmunization    Diagnoses and all orders for this visit:    1. Monochorionic diamniotic twin pregnancy, antepartum (Primary)  Assessment & Plan:  Patient returns today for follow-up for monochorionic diamniotic twins.  Pregnancy is additionally complicated by anti-C antibodies.  Her initial titer was 1-1 28.  Patient notes no complications since her last visit.    Ultrasound today " demonstrates 2 normally grown fetuses with no abnormalities seen.  Amniotic fluid volume and cervical length is normal for both twins.  Umbilical artery Dopplers normal for both twins.  There is no evidence of twin-twin transfusion syndrome.  MCA Doppler peak velocities are normal for both twins.    Patient was counseled regarding the plan for future surveillance.  Owing to her isoimmunization we will be seeing the patient every 2 weeks and performing growth scans every month and fluid and middle cerebral Dopplers every 2 weeks.  We have encouraged the patient to continue her monthly visits that her local OB office is the hope is that she will be able to deliver at her near term at her local OB office.    Patient was counseled regarding  labor and will contact her provider immediately if she notices signs or symptoms of  labor.  We will rescan the patient again in 2 weeks.    Orders:  -      [facility]; Future    2. 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  Since her last visit her partner has been tested and the father the baby's is C antigen positive.  Consequently these fetuses are at risk for hydrops due to the patient's antibodies.  Her initial antibody titer is 1-1 28 and further titers are not necessary.    Ultrasound today demonstrates a normally grown fetus with no abnormality seen amniotic fluid volume and cervical length were normal.  MCA Doppler peak velocities for both twins are entirely normal today.    We will rescan the patient again in 2 weeks time to assess for twin-twin transfusion syndrome but also to repeat middle cerebral artery Dopplers.    Orders:  -      [facility]; Future    3. Previous  delivery affecting pregnancy  -      [facility]; Future    4. 18 weeks gestation of pregnancy  -     [facility]; Future         Follow Up  Return in about 2 weeks (around 2024).    I spent 20 minutes caring for the patient on the day of service. This included: obtaining or reviewing a separately obtained medical history, reviewing patient records, performing a medically appropriate exam and/or evaluation, counseling or educating the patient/family/caregiver, ordering medications, labs, and/or procedures and documenting such in the medical record. This does not include time spent on review and interpretation of other tests such as fetal ultrasound or the performance of other procedures such as amniocentesis or CVS.      Douglas A. Milligan, MD  Maternal Fetal Medicine, [facility] [place]     2024

## 2024-12-03 NOTE — PROGRESS NOTES
"Documentation of the ultrasound findings, images, and interpretations will be available in the patient's Viewpoint report which is located in the imaging tab in chart review.    Maternal/Fetal Medicine Follow Up  Note     Name: Claudia Olmos    : 2000     MRN: [id number]     Referring Provider: SARIKA Recinos    Chief Complaint  Mo/di twins; + Anti C Antibodies; Prev c/s x2    Subjective     History of Present Illness:  Claudia Olmos is a 24 y.o.  18w2d who presents today for Twins, isoimmunization    VANDANA: Estimated Date of Delivery: 25     ROS:   As noted in HPI.     Objective     Vital Signs  /72   Wt 83 kg (183 lb)   LMP  (LMP Unknown)   Estimated body mass index is 33.47 kg/m² as calculated from the following:    Height as of 10/22/24: 157.5 cm (62\").    Weight as of this encounter: 83 kg (183 lb).    Physical Exam    Ultrasound Impression:   See VIewpoint    Assessment and Plan     Claudia Olmos is a 24 y.o.  18w2d who presents today for twins, isoimmunization    Diagnoses and all orders for this visit:    1. Monochorionic diamniotic twin pregnancy, antepartum (Primary)  Assessment & Plan:  Patient returns today for follow-up for monochorionic diamniotic twins.  Pregnancy is additionally complicated by anti-C antibodies.  Her initial titer was 1-1 28.  Patient notes no complications since her last visit.    Ultrasound today demonstrates 2 normally grown fetuses with no abnormalities seen.  Amniotic fluid volume and cervical length is normal for both twins.  Umbilical artery Dopplers normal for both twins.  There is no evidence of twin-twin transfusion syndrome.  MCA Doppler peak velocities are normal for both twins.    Patient was counseled regarding the plan for future surveillance.  Owing to her isoimmunization we will be seeing the patient every 2 weeks and performing growth scans every month and fluid and middle cerebral Dopplers every 2 weeks.  We have encouraged the patient " to continue her monthly visits that her local OB office is the hope is that she will be able to deliver at her near term at her local OB office.    Patient was counseled regarding  labor and will contact her provider immediately if she notices signs or symptoms of  labor.  We will rescan the patient again in 2 weeks.    Orders:  -     [facility]; Future    2. 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  Since her last visit her partner has been tested and the father the baby's is C antigen positive.  Consequently these fetuses are at risk for hydrops due to the patient's antibodies.  Her initial antibody titer is 1-1 28 and further titers are not necessary.    Ultrasound today demonstrates a normally grown fetus with no abnormality seen amniotic fluid volume and cervical length were normal.  MCA Doppler peak velocities for both twins are entirely normal today.    We will rescan the patient again in 2 weeks time to assess for twin-twin transfusion syndrome but also to repeat middle cerebral artery Dopplers.    Orders:  -     [facility]; Future    3. Previous  delivery affecting pregnancy  -     [facility]; Future    4. 18 weeks gestation of pregnancy  -     [facility]; Future         Follow Up  Return in about 2 weeks (around 2024).    I spent 20 minutes caring for the patient on the day of service. This included: obtaining or reviewing a separately obtained medical history, reviewing patient records, performing a medically appropriate exam and/or evaluation, counseling or educating the patient/family/caregiver, ordering medications, labs, and/or procedures and documenting such in the medical record. This does not include time  spent on review and interpretation of other tests such as fetal ultrasound or the performance of other procedures such as amniocentesis or CVS.      Douglas A. Milligan, MD  Maternal Fetal Medicine, [facility]     2024

## 2024-12-03 NOTE — ASSESSMENT & PLAN NOTE
Patient returns today for follow-up for pregnancy complicated by anti-HCV antibodies.  Since her last visit her partner has been tested and the father the baby's is C antigen positive.  Consequently these fetuses are at risk for hydrops due to the patient's antibodies.  Her initial antibody titer is 1-1 28 and further titers are not necessary.    Ultrasound today demonstrates a normally grown fetus with no abnormality seen amniotic fluid volume and cervical length were normal.  MCA Doppler peak velocities for both twins are entirely normal today.    We will rescan the patient again in 2 weeks time to assess for twin-twin transfusion syndrome but also to repeat middle cerebral artery Dopplers.

## 2024-12-03 NOTE — PROGRESS NOTES
Patient denies any leaking fluid, bleeding, or contractions  NIPT negative  Patient states follow up with AMBER Brice, APRN, is 12/10.

## 2024-12-03 NOTE — ASSESSMENT & PLAN NOTE
Patient returns today for follow-up for monochorionic diamniotic twins.  Pregnancy is additionally complicated by anti-C antibodies.  Her initial titer was 1-1 28.  Patient notes no complications since her last visit.    Ultrasound today demonstrates 2 normally grown fetuses with no abnormalities seen.  Amniotic fluid volume and cervical length is normal for both twins.  Umbilical artery Dopplers normal for both twins.  There is no evidence of twin-twin transfusion syndrome.  MCA Doppler peak velocities are normal for both twins.    Patient was counseled regarding the plan for future surveillance.  Owing to her isoimmunization we will be seeing the patient every 2 weeks and performing growth scans every month and fluid and middle cerebral Dopplers every 2 weeks.  We have encouraged the patient to continue her monthly visits that her local OB office is the hope is that she will be able to deliver at her near term at her local OB office.    Patient was counseled regarding  labor and will contact her provider immediately if she notices signs or symptoms of  labor.  We will rescan the patient again in 2 weeks.

## 2024-12-18 ENCOUNTER — OFFICE VISIT (OUTPATIENT)
Dept: OBSTETRICS AND GYNECOLOGY | Facility: HOSPITAL | Age: 24
End: 2024-12-18
Payer: COMMERCIAL

## 2024-12-18 ENCOUNTER — HOSPITAL ENCOUNTER (OUTPATIENT)
Dept: WOMENS IMAGING | Facility: HOSPITAL | Age: 24
Discharge: HOME | End: 2024-12-18
Admitting: OBSTETRICS & GYNECOLOGY
Payer: COMMERCIAL

## 2024-12-18 VITALS — WEIGHT: 186 LBS | DIASTOLIC BLOOD PRESSURE: 79 MMHG | SYSTOLIC BLOOD PRESSURE: 122 MMHG | BODY MASS INDEX: 34.02 KG/M2

## 2024-12-18 DIAGNOSIS — O36.1190 ISOIMMUNIZATION FROM BLOOD GROUP INCOMPATIBILITY DURING PREGNANCY, ANTEPARTUM, SINGLE OR UNSPECIFIED FETUS: ICD-10-CM

## 2024-12-18 DIAGNOSIS — O34.219 PREVIOUS CESAREAN DELIVERY AFFECTING PREGNANCY: ICD-10-CM

## 2024-12-18 DIAGNOSIS — O30.039 MONOCHORIONIC DIAMNIOTIC TWIN PREGNANCY, ANTEPARTUM: ICD-10-CM

## 2024-12-18 DIAGNOSIS — O36.1190 ISOIMMUNIZATION FROM BLOOD GROUP INCOMPATIBILITY DURING PREGNANCY, ANTEPARTUM, SINGLE OR UNSPECIFIED FETUS: Primary | ICD-10-CM

## 2024-12-18 DIAGNOSIS — Z3A.18 18 WEEKS GESTATION OF PREGNANCY: ICD-10-CM

## 2024-12-18 DIAGNOSIS — Z3A.20 20 WEEKS GESTATION OF PREGNANCY: ICD-10-CM

## 2024-12-18 PROCEDURE — 76820 UMBILICAL ARTERY ECHO: CPT

## 2024-12-18 PROCEDURE — 76821 MIDDLE CEREBRAL ARTERY ECHO: CPT

## 2024-12-18 PROCEDURE — 76815 OB US LIMITED FETUS(S): CPT

## 2024-12-18 NOTE — LETTER
"2024     SARIKA Recinos  [address] [facility] [address]    Patient: Claudia Olmos   YOB: 2000   Date of Visit: 2024       Dear SARIKA Recinos,    Thank you for referring Claudia Olmos to me for evaluation. Below is a copy of my consult note.    If you have questions, please do not hesitate to call me. I look forward to following Claudia along with you.         Sincerely,        Alejandra Titus MD        CC: No Recipients                        Maternal/Fetal Medicine Consult Note     Name: Claudia Olmos    : 2000     MRN: [id number]     Referring Provider: SARIKA Recinos    Chief Complaint  Mo/Di twins; Hx c/s; Anti C    Subjective     History of Present Illness:  Claudia Olmos is a 24 y.o.  20w3d who presents today for a limited scan for TTTS surveillance and MCA Dopplers secondary to Anti-c antibody with titers greater than the critical value.     Pt denies LOF/VB/ctx's. +FM x 2.     VANDANA: Estimated Date of Delivery: 25     ROS:   As noted in HPI.     Past Medical History:   Diagnosis Date   • Chlamydia    • History of transfusion 2018    had one during c section      Past Surgical History:   Procedure Laterality Date   •  SECTION     •  SECTION     • TONSILLECTOMY AND ADENOIDECTOMY        OB History          3    Para   2    Term   2       0    AB   0    Living   2         SAB   0    IAB   0    Ectopic   0    Molar   0    Multiple   0    Live Births   2          Obstetric Comments   Fob #1 - Pregnancy #1  Fob #2 - Pregnancy #2   FOB #2 Pregnancy #3               Objective     Vital Signs  /79   Wt 84.4 kg (186 lb)   LMP  (LMP Unknown)   Estimated body mass index is 34.02 kg/m² as calculated from the following:    Height as of 10/22/24: 157.5 cm (62\").    Weight as of this encounter: 84.4 kg (186 lb).    Physical Exam  Constitutional:       Appearance: Normal appearance. She is normal weight.   HENT:      Head: " Normocephalic and atraumatic.   Pulmonary:      Effort: Pulmonary effort is normal.   Musculoskeletal:         General: Normal range of motion.   Neurological:      General: No focal deficit present.      Mental Status: She is alert and oriented to person, place, and time.   Psychiatric:         Mood and Affect: Mood normal.         Behavior: Behavior normal.         Thought Content: Thought content normal.         Judgment: Judgment normal.       Ultrasound Impression:   Br/Vtx, A's MVP is 4.1cms, B's MVP is 7.4cms, membrane clings slightly to A, nl bladders x 2, nl MCA Dopplers, nl UA Dopplers.    Assessment and Plan     Diagnoses and all orders for this visit:    1. 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      - Follow-up scheduled in 1 week and in 2wks    Orders:  -     [facility]; Future    2. Previous  delivery affecting pregnancy  -     [facility]; Future    3. Monochorionic diamniotic twin pregnancy, antepartum  Assessment & Plan:  On ultrasound today, there is a subjective difference in the amniotic fluid levels between the two fetuses. All other markers for TTTS are normal for both twins, but out of an abundance of caution, we are going to see patient in 1 wk and in 2 wks.      - Follow-up scheduled in 1 week for repeat TTTS check   - Precautions reviewed with patient    Orders:  -     [facility]; Future    4. 20 weeks gestation of pregnancy  -     [facility]; Future         Follow Up  Return in about 1 week (around 2024).    I spent 20 minutes caring for the patient on the day of service. This included: obtaining or reviewing a separately obtained medical history, reviewing patient records, performing a medically appropriate exam and/or evaluation, counseling or  educating the patient/family/caregiver, ordering medications, labs, and/or procedures and documenting such in the medical record. This does not include time spent on review and interpretation of other tests such as fetal ultrasound or the performance of other procedures such as amniocentesis or CVS.      Alejandra Titus MD  12/18/2024

## 2024-12-18 NOTE — ASSESSMENT & PLAN NOTE
On ultrasound today, there is a subjective difference in the amniotic fluid levels between the two fetuses. All other markers for TTTS are normal for both twins, but out of an abundance of caution, we are going to see patient in 1 wk and in 2 wks.      - Follow-up scheduled in 1 week for repeat TTTS check   - Precautions reviewed with patient

## 2024-12-18 NOTE — PROGRESS NOTES
Patient denies any leaking fluid, vaginal bleeding, or contractions.  NIPT negative.  Patient reports next follow-up appointment with AMBER Brice's office is 12/26.

## 2024-12-18 NOTE — PROGRESS NOTES
"    Maternal/Fetal Medicine Consult Note     Name: Claudia Olmos    : 2000     MRN: [id number]     Referring Provider: SARIKA Recinos    Chief Complaint  Mo/Di twins; Hx c/s; Anti C    Subjective     History of Present Illness:  Claudia Olmos is a 24 y.o.  20w3d who presents today for a limited scan for TTTS surveillance and MCA Dopplers secondary to Anti-c antibody with titers greater than the critical value.     Pt denies LOF/VB/ctx's. +FM x 2.     VANDANA: Estimated Date of Delivery: 25     ROS:   As noted in HPI.     Past Medical History:   Diagnosis Date    Chlamydia     History of transfusion 2018    had one during c section      Past Surgical History:   Procedure Laterality Date     SECTION       SECTION      TONSILLECTOMY AND ADENOIDECTOMY        OB History          3    Para   2    Term   2       0    AB   0    Living   2         SAB   0    IAB   0    Ectopic   0    Molar   0    Multiple   0    Live Births   2          Obstetric Comments   Fob #1 - Pregnancy #1  Fob #2 - Pregnancy #2   FOB #2 Pregnancy #3               Objective     Vital Signs  /79   Wt 84.4 kg (186 lb)   LMP  (LMP Unknown)   Estimated body mass index is 34.02 kg/m² as calculated from the following:    Height as of 10/22/24: 157.5 cm (62\").    Weight as of this encounter: 84.4 kg (186 lb).    Physical Exam  Constitutional:       Appearance: Normal appearance. She is normal weight.   HENT:      Head: Normocephalic and atraumatic.   Pulmonary:      Effort: Pulmonary effort is normal.   Musculoskeletal:         General: Normal range of motion.   Neurological:      General: No focal deficit present.      Mental Status: She is alert and oriented to person, place, and time.   Psychiatric:         Mood and Affect: Mood normal.         Behavior: Behavior normal.         Thought Content: Thought content normal.         Judgment: Judgment normal.       Ultrasound Impression:   Br/Vtx, A's " MVP is 4.1cms, B's MVP is 7.4cms, membrane clings slightly to A, nl bladders x 2, nl MCA Dopplers, nl UA Dopplers.    Assessment and Plan     Diagnoses and all orders for this visit:    1. 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      - Follow-up scheduled in 1 week and in 2wks    Orders:  -     [facility]; Future    2. Previous  delivery affecting pregnancy  -     [facility]; Future    3. Monochorionic diamniotic twin pregnancy, antepartum  Assessment & Plan:  On ultrasound today, there is a subjective difference in the amniotic fluid levels between the two fetuses. All other markers for TTTS are normal for both twins, but out of an abundance of caution, we are going to see patient in 1 wk and in 2 wks.      - Follow-up scheduled in 1 week for repeat TTTS check   - Precautions reviewed with patient    Orders:  -     [facility]; Future    4. 20 weeks gestation of pregnancy  -     [facility]; Future         Follow Up  Return in about 1 week (around 2024).    I spent 20 minutes caring for the patient on the day of service. This included: obtaining or reviewing a separately obtained medical history, reviewing patient records, performing a medically appropriate exam and/or evaluation, counseling or educating the patient/family/caregiver, ordering medications, labs, and/or procedures and documenting such in the medical record. This does not include time spent on review and interpretation of other tests such as fetal ultrasound or the performance of other procedures such as amniocentesis or CVS.      Alejandra Titus MD  2024

## 2024-12-24 ENCOUNTER — HOSPITAL ENCOUNTER (OUTPATIENT)
Dept: WOMENS IMAGING | Facility: HOSPITAL | Age: 24
Discharge: HOME | End: 2024-12-24
Admitting: OBSTETRICS & GYNECOLOGY
Payer: COMMERCIAL

## 2024-12-24 ENCOUNTER — OFFICE VISIT (OUTPATIENT)
Dept: OBSTETRICS AND GYNECOLOGY | Facility: HOSPITAL | Age: 24
End: 2024-12-24
Payer: COMMERCIAL

## 2024-12-24 VITALS — SYSTOLIC BLOOD PRESSURE: 111 MMHG | DIASTOLIC BLOOD PRESSURE: 56 MMHG | WEIGHT: 186.6 LBS | BODY MASS INDEX: 34.13 KG/M2

## 2024-12-24 DIAGNOSIS — O36.1190 ISOIMMUNIZATION FROM BLOOD GROUP INCOMPATIBILITY DURING PREGNANCY, ANTEPARTUM, SINGLE OR UNSPECIFIED FETUS: ICD-10-CM

## 2024-12-24 DIAGNOSIS — Z3A.20 20 WEEKS GESTATION OF PREGNANCY: ICD-10-CM

## 2024-12-24 DIAGNOSIS — O30.039 MONOCHORIONIC DIAMNIOTIC TWIN PREGNANCY, ANTEPARTUM: ICD-10-CM

## 2024-12-24 DIAGNOSIS — O34.219 PREVIOUS CESAREAN DELIVERY AFFECTING PREGNANCY: ICD-10-CM

## 2024-12-24 DIAGNOSIS — O30.039 MONOCHORIONIC DIAMNIOTIC TWIN PREGNANCY, ANTEPARTUM: Primary | ICD-10-CM

## 2024-12-24 PROCEDURE — 76821 MIDDLE CEREBRAL ARTERY ECHO: CPT

## 2024-12-24 PROCEDURE — 76815 OB US LIMITED FETUS(S): CPT

## 2024-12-24 PROCEDURE — 76820 UMBILICAL ARTERY ECHO: CPT

## 2025-01-02 ENCOUNTER — HOSPITAL ENCOUNTER (OUTPATIENT)
Dept: WOMENS IMAGING | Facility: HOSPITAL | Age: 25
Discharge: HOME | End: 2025-01-02
Admitting: OBSTETRICS & GYNECOLOGY
Payer: COMMERCIAL

## 2025-01-02 ENCOUNTER — OFFICE VISIT (OUTPATIENT)
Dept: OBSTETRICS AND GYNECOLOGY | Facility: HOSPITAL | Age: 25
End: 2025-01-02
Payer: COMMERCIAL

## 2025-01-02 VITALS — DIASTOLIC BLOOD PRESSURE: 60 MMHG | BODY MASS INDEX: 33.84 KG/M2 | SYSTOLIC BLOOD PRESSURE: 118 MMHG | WEIGHT: 185 LBS

## 2025-01-02 DIAGNOSIS — O30.039 MONOCHORIONIC DIAMNIOTIC TWIN PREGNANCY, ANTEPARTUM: Primary | ICD-10-CM

## 2025-01-02 DIAGNOSIS — O34.219 PREVIOUS CESAREAN DELIVERY AFFECTING PREGNANCY: ICD-10-CM

## 2025-01-02 DIAGNOSIS — O36.1191: ICD-10-CM

## 2025-01-02 DIAGNOSIS — Z34.90 PREGNANCY, UNSPECIFIED GESTATIONAL AGE: ICD-10-CM

## 2025-01-02 DIAGNOSIS — O30.039 MONOCHORIONIC DIAMNIOTIC TWIN PREGNANCY, ANTEPARTUM: ICD-10-CM

## 2025-01-02 PROCEDURE — 93325 DOPPLER ECHO COLOR FLOW MAPG: CPT

## 2025-01-02 PROCEDURE — 76821 MIDDLE CEREBRAL ARTERY ECHO: CPT

## 2025-01-02 PROCEDURE — 76816 OB US FOLLOW-UP PER FETUS: CPT

## 2025-01-02 PROCEDURE — 76820 UMBILICAL ARTERY ECHO: CPT

## 2025-01-02 PROCEDURE — 76825 ECHO EXAM OF FETAL HEART: CPT

## 2025-01-02 NOTE — PROGRESS NOTES
"Documentation of the ultrasound findings, images, and interpretations will be available in the patient's Viewpoint report which is located in the imaging tab in chart review.    Maternal/Fetal Medicine Follow Up  Note     Name: Claudia Olmos    : 2000     MRN: [id number]     Referring Provider: SARIKA Recinos    Chief Complaint  mo/di twins, anti-little c with critical titer and FOB anti    Subjective     History of Present Illness:  Claudia Olmos is a 24 y.o.  22w4d who presents today for twins    VANDANA: Estimated Date of Delivery: 25     ROS:   As noted in HPI.     Objective     Vital Signs  /60   Wt 83.9 kg (185 lb)   LMP  (LMP Unknown)   Estimated body mass index is 33.84 kg/m² as calculated from the following:    Height as of 10/22/24: 157.5 cm (62\").    Weight as of this encounter: 83.9 kg (185 lb).    Physical Exam    Ultrasound Impression:   See Viewpoint    Assessment and Plan     Claudia Olmos is a 24 y.o.  22w4d who presents today for twins patient returns today for follow-up for monochorionic diamniotic twins.  Pregnancy is additionally complicated by antilittle C titer with    Diagnoses and all orders for this visit:    1. Monochorionic diamniotic twin pregnancy, antepartum (Primary)  Assessment & Plan:  Patient returns today for pregnancy complicated by Molen no chorionic diamniotic twins and isoimmunization.  Earlier scans did demonstrate a little discrepancy between fluid between the twins.  Middle cerebral artery Dopplers have been normal for these twins.  Patient reports no complications since her last visit.    Ultrasound today today demonstrates monochorionic diamniotic twins.  Both are normally grown.  No abnormalities are seen.  Amniotic fluid volume is normal about both twins.  Cervical length appears normal.  There is no evidence of twin-twin transfusion syndrome.  Middle cerebral artery Doppler peak velocity is normal for both twins with no indication of fetal " anemia.    Patient appears to be doing well with her monochorionic diamniotic twins.  Ordinarily we would have the patient have a twin-twin transfusion check at her local OB office in 2 weeks, but we have to perform MCA Doppler peak velocities in 2 weeks and we will perform this check in our office.  Patient has been scheduled to return in 2 weeks.    We counseled the patient about the increasing risk of  labor with twins and patient will contact her provider immediately if she notices any decreased fetal movement.    Orders:  -     [facility]; Future    2. 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  Patient reports no complications since her last visit.  Middle cerebral artery Doppler peak velocity is normal for both twins today with no evidence of fetal anemia.  We will repeat MCA Dopplers in 2 weeks time at the  diagnostic center.    Orders:  -     [facility] [place]; Future    3. Previous  delivery affecting pregnancy  -     [facility]; Future    4. Pregnancy, unspecified gestational age  -     [facility] [place]; Future         Follow Up  Return in about 2 weeks (around 2025).    I spent 20 minutes caring for the patient on the day of service. This included: obtaining or reviewing a separately obtained medical history, reviewing patient records, performing a medically appropriate exam and/or evaluation, counseling or educating the patient/family/caregiver, ordering medications, labs, and/or procedures and documenting such in the medical record. This does not include time spent on review and interpretation of other tests such as fetal ultrasound or the performance of other procedures such as amniocentesis or CVS.      Dimitri LEW  Milligan, MD  Maternal Fetal Medicine, [facility]     2025

## 2025-01-02 NOTE — ASSESSMENT & PLAN NOTE
Patient returns today for follow-up for isoimmunization with antilittle C antibodies.  Patient reports no complications since her last visit.  Middle cerebral artery Doppler peak velocity is normal for both twins today with no evidence of fetal anemia.  We will repeat MCA Dopplers in 2 weeks time at the  diagnostic center.

## 2025-01-02 NOTE — ASSESSMENT & PLAN NOTE
Patient returns today for pregnancy complicated by Molen no chorionic diamniotic twins and isoimmunization.  Earlier scans did demonstrate a little discrepancy between fluid between the twins.  Middle cerebral artery Dopplers have been normal for these twins.  Patient reports no complications since her last visit.    Ultrasound today today demonstrates monochorionic diamniotic twins.  Both are normally grown.  No abnormalities are seen.  Amniotic fluid volume is normal about both twins.  Cervical length appears normal.  There is no evidence of twin-twin transfusion syndrome.  Middle cerebral artery Doppler peak velocity is normal for both twins with no indication of fetal anemia.    Patient appears to be doing well with her monochorionic diamniotic twins.  Ordinarily we would have the patient have a twin-twin transfusion check at her local OB office in 2 weeks, but we have to perform MCA Doppler peak velocities in 2 weeks and we will perform this check in our office.  Patient has been scheduled to return in 2 weeks.    We counseled the patient about the increasing risk of  labor with twins and patient will contact her provider immediately if she notices any decreased fetal movement.

## 2025-01-02 NOTE — PROGRESS NOTES
Patient denies any leaking fluid, vaginal bleeding, or contractions.  NIPT negative.  Patient reports she is unsure but believes next follow-up appointment with AMBER Brice's office is 1/6.

## 2025-01-02 NOTE — LETTER
"2025     SARIKA Recinos  [address] [facility] [address]    Patient: Claudia Olmos   YOB: 2000   Date of Visit: 2025     Dear SARIKA Recinos:       Thank you for referring Claudia Olmos to me for evaluation. Below are the relevant portions of my assessment and plan of care.    If you have questions, please do not hesitate to call me. I look forward to following Claudia along with you.         Sincerely,        Douglas A. Milligan, MD        CC: No Recipients    Milligan, Douglas A, MD  25 1523  Sign when Signing Visit  Documentation of the ultrasound findings, images, and interpretations will be available in the patient's Viewpoint report which is located in the imaging tab in chart review.    Maternal/Fetal Medicine Follow Up  Note     Name: Claudia Olmos    : 2000     MRN: [id number]     Referring Provider: SARIKA Recinos    Chief Complaint  mo/di twins, anti-little c with critical titer and FOB anti    Subjective     History of Present Illness:  Claudia Olmos is a 24 y.o.  22w4d who presents today for twins    VANDANA: Estimated Date of Delivery: 25     ROS:   As noted in HPI.     Objective     Vital Signs  /60   Wt 83.9 kg (185 lb)   LMP  (LMP Unknown)   Estimated body mass index is 33.84 kg/m² as calculated from the following:    Height as of 10/22/24: 157.5 cm (62\").    Weight as of this encounter: 83.9 kg (185 lb).    Physical Exam    Ultrasound Impression:   See Viewpoint    Assessment and Plan     Claudia Olmos is a 24 y.o.  22w4d who presents today for twins patient returns today for follow-up for monochorionic diamniotic twins.  Pregnancy is additionally complicated by antilittle C titer with    Diagnoses and all orders for this visit:    1. Monochorionic diamniotic twin pregnancy, antepartum (Primary)  Assessment & Plan:  Patient returns today for pregnancy complicated by Molen no chorionic diamniotic twins and isoimmunization.  Earlier " scans did demonstrate a little discrepancy between fluid between the twins.  Middle cerebral artery Dopplers have been normal for these twins.  Patient reports no complications since her last visit.    Ultrasound today today demonstrates monochorionic diamniotic twins.  Both are normally grown.  No abnormalities are seen.  Amniotic fluid volume is normal about both twins.  Cervical length appears normal.  There is no evidence of twin-twin transfusion syndrome.  Middle cerebral artery Doppler peak velocity is normal for both twins with no indication of fetal anemia.    Patient appears to be doing well with her monochorionic diamniotic twins.  Ordinarily we would have the patient have a twin-twin transfusion check at her local OB office in 2 weeks, but we have to perform MCA Doppler peak velocities in 2 weeks and we will perform this check in our office.  Patient has been scheduled to return in 2 weeks.    We counseled the patient about the increasing risk of  labor with twins and patient will contact her provider immediately if she notices any decreased fetal movement.    Orders:  -     [email or web address]; Future    2. 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  Patient reports no complications since her last visit.  Middle cerebral artery Doppler peak velocity is normal for both twins today with no evidence of fetal anemia.  We will repeat MCA Dopplers in 2 weeks time at the  diagnostic center.    Orders:  -     [email or web address]; Future    3. Previous  delivery affecting pregnancy  -      [facility]; Future    4. Pregnancy, unspecified gestational age  -      [facility]; Future         Follow Up  Return in about 2 weeks (around 2025).    I  spent 20 minutes caring for the patient on the day of service. This included: obtaining or reviewing a separately obtained medical history, reviewing patient records, performing a medically appropriate exam and/or evaluation, counseling or educating the patient/family/caregiver, ordering medications, labs, and/or procedures and documenting such in the medical record. This does not include time spent on review and interpretation of other tests such as fetal ultrasound or the performance of other procedures such as amniocentesis or CVS.      Douglas A. Milligan, MD  Maternal Fetal Medicine, [facility]     2025

## 2025-01-16 ENCOUNTER — HOSPITAL ENCOUNTER (OUTPATIENT)
Dept: WOMENS IMAGING | Facility: HOSPITAL | Age: 25
Discharge: HOME | End: 2025-01-16
Admitting: OBSTETRICS & GYNECOLOGY
Payer: COMMERCIAL

## 2025-01-16 ENCOUNTER — OFFICE VISIT (OUTPATIENT)
Dept: OBSTETRICS AND GYNECOLOGY | Facility: HOSPITAL | Age: 25
End: 2025-01-16
Payer: COMMERCIAL

## 2025-01-16 VITALS — DIASTOLIC BLOOD PRESSURE: 73 MMHG | SYSTOLIC BLOOD PRESSURE: 114 MMHG | BODY MASS INDEX: 34.02 KG/M2 | WEIGHT: 186 LBS

## 2025-01-16 DIAGNOSIS — O30.039 MONOCHORIONIC DIAMNIOTIC TWIN PREGNANCY, ANTEPARTUM: ICD-10-CM

## 2025-01-16 DIAGNOSIS — O30.039 MONOCHORIONIC DIAMNIOTIC TWIN PREGNANCY, ANTEPARTUM: Primary | ICD-10-CM

## 2025-01-16 PROCEDURE — 76820 UMBILICAL ARTERY ECHO: CPT

## 2025-01-16 PROCEDURE — 76815 OB US LIMITED FETUS(S): CPT

## 2025-01-16 PROCEDURE — 76821 MIDDLE CEREBRAL ARTERY ECHO: CPT

## 2025-01-16 NOTE — PROGRESS NOTES
Patient denies any leaking fluid, vaginal bleeding, or contractions.  NIPT negative.  Patient reports next follow-up appointment with SARIKA Alvarenga, office is 1/31.

## 2025-01-30 ENCOUNTER — OFFICE VISIT (OUTPATIENT)
Dept: OBSTETRICS AND GYNECOLOGY | Facility: HOSPITAL | Age: 25
End: 2025-01-30
Payer: COMMERCIAL

## 2025-01-30 ENCOUNTER — HOSPITAL ENCOUNTER (OUTPATIENT)
Dept: WOMENS IMAGING | Facility: HOSPITAL | Age: 25
Discharge: HOME | End: 2025-01-30
Admitting: OBSTETRICS & GYNECOLOGY
Payer: COMMERCIAL

## 2025-01-30 VITALS — BODY MASS INDEX: 34.75 KG/M2 | WEIGHT: 190 LBS | SYSTOLIC BLOOD PRESSURE: 127 MMHG | DIASTOLIC BLOOD PRESSURE: 63 MMHG

## 2025-01-30 DIAGNOSIS — O30.039 MONOCHORIONIC DIAMNIOTIC TWIN PREGNANCY, ANTEPARTUM: ICD-10-CM

## 2025-01-30 DIAGNOSIS — O36.1191: Primary | ICD-10-CM

## 2025-01-30 PROCEDURE — 76820 UMBILICAL ARTERY ECHO: CPT

## 2025-01-30 PROCEDURE — 76816 OB US FOLLOW-UP PER FETUS: CPT

## 2025-01-30 PROCEDURE — 76821 MIDDLE CEREBRAL ARTERY ECHO: CPT

## 2025-02-13 ENCOUNTER — HOSPITAL ENCOUNTER (OUTPATIENT)
Dept: WOMENS IMAGING | Facility: HOSPITAL | Age: 25
Discharge: HOME | End: 2025-02-13
Admitting: OBSTETRICS & GYNECOLOGY
Payer: COMMERCIAL

## 2025-02-13 ENCOUNTER — OFFICE VISIT (OUTPATIENT)
Dept: OBSTETRICS AND GYNECOLOGY | Facility: HOSPITAL | Age: 25
End: 2025-02-13
Payer: COMMERCIAL

## 2025-02-13 VITALS — SYSTOLIC BLOOD PRESSURE: 110 MMHG | BODY MASS INDEX: 34.75 KG/M2 | DIASTOLIC BLOOD PRESSURE: 68 MMHG | WEIGHT: 190 LBS

## 2025-02-13 DIAGNOSIS — O30.039 MONOCHORIONIC DIAMNIOTIC TWIN PREGNANCY, ANTEPARTUM: ICD-10-CM

## 2025-02-13 DIAGNOSIS — O30.039 MONOCHORIONIC DIAMNIOTIC TWIN PREGNANCY, ANTEPARTUM: Primary | ICD-10-CM

## 2025-02-13 DIAGNOSIS — O36.1191: ICD-10-CM

## 2025-02-13 PROCEDURE — 76820 UMBILICAL ARTERY ECHO: CPT

## 2025-02-13 PROCEDURE — 76815 OB US LIMITED FETUS(S): CPT

## 2025-02-13 PROCEDURE — 76821 MIDDLE CEREBRAL ARTERY ECHO: CPT

## 2025-02-13 RX ORDER — FERROUS SULFATE 325(65) MG
TABLET ORAL
COMMUNITY
Start: 2025-02-03

## 2025-02-13 NOTE — PROGRESS NOTES
Patient denies any leaking fluid, vaginal bleeding, or contractions.  NIPT negative.  Patient reports next follow-up appointment with AMBER Brice's office is 2/14.

## 2025-02-19 NOTE — PROGRESS NOTES
Patient seen in  Diagnostic Center today for fetal ultrasound.    Please see ultrasound report under imaging tab of patient chart in Epic (Viewpoint report).    Kimberley Zapata MD

## 2025-02-27 ENCOUNTER — OFFICE VISIT (OUTPATIENT)
Dept: OBSTETRICS AND GYNECOLOGY | Facility: HOSPITAL | Age: 25
End: 2025-02-27
Payer: COMMERCIAL

## 2025-02-27 ENCOUNTER — HOSPITAL ENCOUNTER (OUTPATIENT)
Dept: WOMENS IMAGING | Facility: HOSPITAL | Age: 25
Discharge: HOME | End: 2025-02-27
Payer: COMMERCIAL

## 2025-02-27 VITALS — SYSTOLIC BLOOD PRESSURE: 114 MMHG | WEIGHT: 192.8 LBS | DIASTOLIC BLOOD PRESSURE: 64 MMHG | BODY MASS INDEX: 35.26 KG/M2

## 2025-02-27 DIAGNOSIS — O36.1191: ICD-10-CM

## 2025-02-27 DIAGNOSIS — O30.039 MONOCHORIONIC DIAMNIOTIC TWIN PREGNANCY, ANTEPARTUM: Primary | ICD-10-CM

## 2025-02-27 PROCEDURE — 76820 UMBILICAL ARTERY ECHO: CPT

## 2025-02-27 PROCEDURE — 76821 MIDDLE CEREBRAL ARTERY ECHO: CPT

## 2025-02-27 PROCEDURE — 76816 OB US FOLLOW-UP PER FETUS: CPT

## 2025-02-27 RX ORDER — DOCUSATE SODIUM 100 MG/1
100 CAPSULE, LIQUID FILLED ORAL 2 TIMES DAILY PRN
COMMUNITY
Start: 2025-02-03

## 2025-03-13 ENCOUNTER — HOSPITAL ENCOUNTER (OUTPATIENT)
Dept: WOMENS IMAGING | Facility: HOSPITAL | Age: 25
Discharge: HOME | End: 2025-03-13
Payer: COMMERCIAL

## 2025-03-13 ENCOUNTER — OFFICE VISIT (OUTPATIENT)
Dept: OBSTETRICS AND GYNECOLOGY | Facility: HOSPITAL | Age: 25
End: 2025-03-13
Payer: COMMERCIAL

## 2025-03-13 VITALS — DIASTOLIC BLOOD PRESSURE: 73 MMHG | WEIGHT: 198 LBS | SYSTOLIC BLOOD PRESSURE: 119 MMHG | BODY MASS INDEX: 36.21 KG/M2

## 2025-03-13 DIAGNOSIS — O36.1191: ICD-10-CM

## 2025-03-13 DIAGNOSIS — O30.039 MONOCHORIONIC DIAMNIOTIC TWIN PREGNANCY, ANTEPARTUM: Primary | ICD-10-CM

## 2025-03-13 PROCEDURE — 76819 FETAL BIOPHYS PROFIL W/O NST: CPT

## 2025-03-13 PROCEDURE — 76821 MIDDLE CEREBRAL ARTERY ECHO: CPT

## 2025-03-13 PROCEDURE — 76820 UMBILICAL ARTERY ECHO: CPT

## 2025-03-13 NOTE — PROGRESS NOTES
Patient denies any leaking fluid, vaginal bleeding, or contractions.  NIPT negative.  Patient reports next follow-up appointment with AMBER Brice's office is 3/14.

## 2025-03-26 ENCOUNTER — HOSPITAL ENCOUNTER (OUTPATIENT)
Dept: WOMENS IMAGING | Facility: HOSPITAL | Age: 25
Discharge: HOME | End: 2025-03-26
Payer: COMMERCIAL

## 2025-03-26 ENCOUNTER — OFFICE VISIT (OUTPATIENT)
Dept: OBSTETRICS AND GYNECOLOGY | Facility: HOSPITAL | Age: 25
End: 2025-03-26
Payer: COMMERCIAL

## 2025-03-26 VITALS — BODY MASS INDEX: 37.13 KG/M2 | SYSTOLIC BLOOD PRESSURE: 125 MMHG | DIASTOLIC BLOOD PRESSURE: 77 MMHG | WEIGHT: 203 LBS

## 2025-03-26 DIAGNOSIS — O36.1191: Primary | ICD-10-CM

## 2025-03-26 DIAGNOSIS — O30.039 MONOCHORIONIC DIAMNIOTIC TWIN PREGNANCY, ANTEPARTUM: ICD-10-CM

## 2025-03-26 PROCEDURE — 76820 UMBILICAL ARTERY ECHO: CPT

## 2025-03-26 PROCEDURE — 76821 MIDDLE CEREBRAL ARTERY ECHO: CPT

## 2025-03-26 PROCEDURE — 76816 OB US FOLLOW-UP PER FETUS: CPT

## 2025-03-26 PROCEDURE — 76819 FETAL BIOPHYS PROFIL W/O NST: CPT

## 2025-03-26 NOTE — ASSESSMENT & PLAN NOTE
Appropriate and concordant growth today   No evidence of TTTS   MCA dopplers are 1.4 MoMs for both fetuses   No evidence of hydrops   BPP 8/8 x 2   Plan repeat MCA dopplers early next week as a precaution

## 2025-03-26 NOTE — PROGRESS NOTES
"    Maternal/Fetal Medicine Follow Up Note     Name: Claudia Olmos    : 2000     MRN: [id number]     Referring Provider: SARIKA Recinos    Chief Complaint  Mo/Di Twins; Anti C    Subjective     History of Present Illness:  Claudia Olmos is a 24 y.o.  34w3d who presents today for follow up     VANDANA: Estimated Date of Delivery: 25     ROS:   As noted in HPI.     Objective     Vital Signs  /77   Wt 92.1 kg (203 lb)   LMP  (LMP Unknown)   Estimated body mass index is 37.13 kg/m² as calculated from the following:    Height as of 10/22/24: 157.5 cm (62\").    Weight as of this encounter: 92.1 kg (203 lb).    Ultrasound Impression:   See viewpoint      Assessment and Plan     Claudia Olmos is a 24 y.o.  34w3d     Diagnoses and all orders for this visit:    1. 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 Monochorionic diamniotic twin pregnancy, antepartum  Assessment & Plan:  Appropriate and concordant growth today   No evidence of TTTS   MCA dopplers are 1.4 MoMs for both fetuses   No evidence of hydrops   BPP 8/8 x 2   Plan repeat MCA dopplers early next week as a precaution            Follow Up  On Monday 3/31/25 for repeat MCA dopplers     I spent 20 minutes caring for the patient on the day of service. This included: obtaining or reviewing a separately obtained medical history, reviewing patient records, performing a medically appropriate exam and/or evaluation, counseling or educating the patient/family/caregiver, ordering medications, labs, and/or procedures and documenting such in the medical record. This does not include time spent on review and interpretation of other tests such as fetal ultrasound or the performance of other procedures such as amniocentesis or CVS.    Kimberley Zapata MD FACOG  Maternal Fetal Medicine, [facility]     2025  "

## 2025-03-26 NOTE — PROGRESS NOTES
Patient denies any leaking fluid, vaginal bleeding, or contractions.  NIPT negative.  Patient reports next follow-up appointment with SARIKA Alvarenga, office is 3/28.

## 2025-03-31 ENCOUNTER — HOSPITAL ENCOUNTER (OUTPATIENT)
Dept: WOMENS IMAGING | Facility: HOSPITAL | Age: 25
Discharge: HOME | End: 2025-03-31
Admitting: OBSTETRICS & GYNECOLOGY
Payer: COMMERCIAL

## 2025-03-31 ENCOUNTER — OFFICE VISIT (OUTPATIENT)
Dept: OBSTETRICS AND GYNECOLOGY | Facility: HOSPITAL | Age: 25
End: 2025-03-31
Payer: COMMERCIAL

## 2025-03-31 VITALS — SYSTOLIC BLOOD PRESSURE: 135 MMHG | WEIGHT: 204.4 LBS | DIASTOLIC BLOOD PRESSURE: 72 MMHG | BODY MASS INDEX: 37.39 KG/M2

## 2025-03-31 DIAGNOSIS — O30.039 MONOCHORIONIC DIAMNIOTIC TWIN PREGNANCY, ANTEPARTUM: ICD-10-CM

## 2025-03-31 DIAGNOSIS — O30.039 MONOCHORIONIC DIAMNIOTIC TWIN PREGNANCY, ANTEPARTUM: Primary | ICD-10-CM

## 2025-03-31 DIAGNOSIS — O36.1191: ICD-10-CM

## 2025-03-31 PROCEDURE — 76821 MIDDLE CEREBRAL ARTERY ECHO: CPT

## 2025-03-31 PROCEDURE — 76819 FETAL BIOPHYS PROFIL W/O NST: CPT | Performed by: OBSTETRICS & GYNECOLOGY

## 2025-03-31 PROCEDURE — 99213 OFFICE O/P EST LOW 20 MIN: CPT | Performed by: OBSTETRICS & GYNECOLOGY

## 2025-03-31 PROCEDURE — 76821 MIDDLE CEREBRAL ARTERY ECHO: CPT | Performed by: OBSTETRICS & GYNECOLOGY

## 2025-03-31 PROCEDURE — 76820 UMBILICAL ARTERY ECHO: CPT | Performed by: OBSTETRICS & GYNECOLOGY

## 2025-03-31 PROCEDURE — 76820 UMBILICAL ARTERY ECHO: CPT

## 2025-03-31 PROCEDURE — 76819 FETAL BIOPHYS PROFIL W/O NST: CPT

## 2025-03-31 RX ORDER — FAMOTIDINE 20 MG/1
20 TABLET, FILM COATED ORAL AS NEEDED
COMMUNITY

## 2025-03-31 NOTE — PROGRESS NOTES
Patient denies any leaking fluid, vaginal bleeding, or contractions.  NIPT negative.  Patient reports next follow-up appointment with AMBER Brice's office is 4/3 or 4/4.

## 2025-03-31 NOTE — PROGRESS NOTES
"    Maternal/Fetal Medicine Consult Note   Date: 2025  Name: Claudia Olmos    : 2000     MRN: [id number]     Referring Provider: SARIKA Recinos    Chief Complaint  mo/di twins; anti-C; prev. c/s x2    Subjective     History of Present Illness:  Claudia Olmos is a 24 y.o.  35w1d who presents today for mono Di twins and alloimmunization    VANDANA: Estimated Date of Delivery: 25     ROS:   Otherwise Noted in HPI      Current Outpatient Medications:     docusate sodium (COLACE) 100 MG capsule, Take 1 capsule by mouth 2 (Two) Times a Day As Needed., Disp: , Rfl:     famotidine (PEPCID) 20 MG tablet, Take 1 tablet by mouth As Needed for Heartburn., Disp: , Rfl:     FeroSul 325 (65 Fe) MG tablet, , Disp: , Rfl:     Prenatal Vit-Fe Fumarate-FA (prenatal vitamin 27-0.8) 27-0.8 MG tablet tablet, Take  by mouth Daily., Disp: , Rfl:     promethazine (PHENERGAN) 12.5 MG tablet, Take 1 tablet by mouth Every 6 (Six) Hours As Needed., Disp: , Rfl:     Objective     Vital Signs  /72   Wt 92.7 kg (204 lb 6.4 oz)   LMP  (LMP Unknown)   Estimated body mass index is 37.39 kg/m² as calculated from the following:    Height as of 10/22/24: 157.5 cm (62\").    Weight as of this encounter: 92.7 kg (204 lb 6.4 oz).    Ultrasound Impression:   See Viewpoint     Assessment and Plan     Claudia Olmos is a 24 y.o.  35w1d who presents today for mono Di twins and alloimmunization    Diagnoses and all orders for this visit:    1. Monochorionic diamniotic twin pregnancy, antepartum (Primary)  Assessment & Plan:  Ultrasounds last week that showed normal growth with normal amniotic fluid.  Normal umbilical artery Dopplers.  MCA Dopplers are stable today around 1.4 MOM.  MCA Dopplers can be nonspecific later in the third trimester.  We discussed that given her alloimmunization with borderline MCA Dopplers in the setting of mono Di twins would favor delivery during the 36-week of gestation though if patient desired 37 " weeks this would overall be reasonable if nonstress test twice per week are reassuring until timing of delivery.      2. 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    I spent 10 minutes caring for the patient on the day of service. This included: obtaining or reviewing a separately obtained medical history, reviewing patient records, performing a medically appropriate exam and/or evaluation, counseling or educating the patient/family/caregiver, ordering medications, labs, and/or procedures and documenting such in the medical record. This does not include time spent on review and interpretation of other tests such as fetal ultrasound or the performance of other procedures such as amniocentesis or CVS.      Everardo Madrid MD, FACOG  Maternal Fetal Medicine, [facility] [place]

## 2025-03-31 NOTE — LETTER
"2025     SARIKA Recinos  [address] [facility] [address]    Patient: Claudia Olmos   YOB: 2000   Date of Visit: 3/31/2025     Dear SARIKA Recinos:       Thank you for referring Claudia Olmos to me for evaluation. Below are the relevant portions of my assessment and plan of care.    If you have questions, please do not hesitate to call me. I look forward to following Claudia along with you.         Sincerely,        Everardo Madrid MD        CC: No Recipients    Everardo Madrid MD  25 1439  Sign when Signing Visit      Maternal/Fetal Medicine Consult Note   Date: 2025  Name: Claudia Olmos    : 2000     MRN: [id number]     Referring Provider: SARIKA Recinos    Chief Complaint  mo/di twins; anti-C; prev. c/s x2    Subjective     History of Present Illness:  Claudia Olmos is a 24 y.o.  35w1d who presents today for mono Di twins and alloimmunization    VANDANA: Estimated Date of Delivery: 25     ROS:   Otherwise Noted in HPI      Current Outpatient Medications:   •  docusate sodium (COLACE) 100 MG capsule, Take 1 capsule by mouth 2 (Two) Times a Day As Needed., Disp: , Rfl:   •  famotidine (PEPCID) 20 MG tablet, Take 1 tablet by mouth As Needed for Heartburn., Disp: , Rfl:   •  FeroSul 325 (65 Fe) MG tablet, , Disp: , Rfl:   •  Prenatal Vit-Fe Fumarate-FA (prenatal vitamin 27-0.8) 27-0.8 MG tablet tablet, Take  by mouth Daily., Disp: , Rfl:   •  promethazine (PHENERGAN) 12.5 MG tablet, Take 1 tablet by mouth Every 6 (Six) Hours As Needed., Disp: , Rfl:     Objective     Vital Signs  /72   Wt 92.7 kg (204 lb 6.4 oz)   LMP  (LMP Unknown)   Estimated body mass index is 37.39 kg/m² as calculated from the following:    Height as of 10/22/24: 157.5 cm (62\").    Weight as of this encounter: 92.7 kg (204 lb 6.4 oz).    Ultrasound Impression:   See Viewpoint     Assessment and Plan     Claudia Olmos is a 24 y.o.  35w1d who presents today for mono Di twins and " alloimmunization    Diagnoses and all orders for this visit:    1. Monochorionic diamniotic twin pregnancy, antepartum (Primary)  Assessment & Plan:  Ultrasounds last week that showed normal growth with normal amniotic fluid.  Normal umbilical artery Dopplers.  MCA Dopplers are stable today around 1.4 MOM.  MCA Dopplers can be nonspecific later in the third trimester.  We discussed that given her alloimmunization with borderline MCA Dopplers in the setting of mono Di twins would favor delivery during the 36-week of gestation though if patient desired 37 weeks this would overall be reasonable if nonstress test twice per week are reassuring until timing of delivery.      2. 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    I spent 10 minutes caring for the patient on the day of service. This included: obtaining or reviewing a separately obtained medical history, reviewing patient records, performing a medically appropriate exam and/or evaluation, counseling or educating the patient/family/caregiver, ordering medications, labs, and/or procedures and documenting such in the medical record. This does not include time spent on review and interpretation of other tests such as fetal ultrasound or the performance of other procedures such as amniocentesis or CVS.      Everardo Madrid MD, FACOG  Maternal Fetal Medicine, [facility] [place]

## 2025-03-31 NOTE — ASSESSMENT & PLAN NOTE
Ultrasounds last week that showed normal growth with normal amniotic fluid.  Normal umbilical artery Dopplers.  MCA Dopplers are stable today around 1.4 MOM.  MCA Dopplers can be nonspecific later in the third trimester.  We discussed that given her alloimmunization with borderline MCA Dopplers in the setting of mono Di twins would favor delivery during the 36-week of gestation though if patient desired 37 weeks this would overall be reasonable if nonstress test twice per week are reassuring until timing of delivery.